# Patient Record
Sex: FEMALE | URBAN - METROPOLITAN AREA
[De-identification: names, ages, dates, MRNs, and addresses within clinical notes are randomized per-mention and may not be internally consistent; named-entity substitution may affect disease eponyms.]

---

## 2023-01-11 ENCOUNTER — OFFICE VISIT (OUTPATIENT)
Dept: BARIATRICS/WEIGHT MGMT | Age: 28
End: 2023-01-11
Payer: MEDICARE

## 2023-01-11 VITALS
WEIGHT: 293 LBS | SYSTOLIC BLOOD PRESSURE: 118 MMHG | DIASTOLIC BLOOD PRESSURE: 78 MMHG | BODY MASS INDEX: 44.41 KG/M2 | HEIGHT: 68 IN | HEART RATE: 90 BPM | RESPIRATION RATE: 20 BRPM

## 2023-01-11 DIAGNOSIS — R06.09 DYSPNEA ON EXERTION: ICD-10-CM

## 2023-01-11 DIAGNOSIS — E66.01 MORBID OBESITY WITH BMI OF 50.0-59.9, ADULT (HCC): ICD-10-CM

## 2023-01-11 DIAGNOSIS — K21.9 GERD WITHOUT ESOPHAGITIS: Primary | ICD-10-CM

## 2023-01-11 PROCEDURE — G8484 FLU IMMUNIZE NO ADMIN: HCPCS | Performed by: SURGERY

## 2023-01-11 PROCEDURE — 4004F PT TOBACCO SCREEN RCVD TLK: CPT | Performed by: SURGERY

## 2023-01-11 PROCEDURE — G8427 DOCREV CUR MEDS BY ELIG CLIN: HCPCS | Performed by: SURGERY

## 2023-01-11 PROCEDURE — G8417 CALC BMI ABV UP PARAM F/U: HCPCS | Performed by: SURGERY

## 2023-01-11 PROCEDURE — 99204 OFFICE O/P NEW MOD 45 MIN: CPT | Performed by: SURGERY

## 2023-01-11 NOTE — PROGRESS NOTES
[facility],[address] INVASIVE BARIATRIC SURG  [address] \[facility] [place] [facility]\"" [address]  Dept: [phone]    SURGICAL WEIGHT MANAGEMENT PROGRAM  PROGRESS NOTE INITIAL EVALUATION     Patient: Alli Chiu        Service Date: 1/11/2023      HPI:     Chief Complaint   Patient presents with    Bariatric, Initial Visit    Weight Loss       The patient is a pleasant 28y.o. year old female  with morbid obesity, who stands Height: 5' 8\" (172.7 cm) tall with a weight of Weight: (!) 330 lb (149.7 kg) , resulting in a BMI of Body mass index is 50.18 kg/m². . The patient suffers from multiple co-morbidities as a result of morbid obesity, including: GERD. She has suffered from obesity for many years. The patient denies  a history of myocardial infarction, deep vein thrombosis, pulmonary embolism, renal failure, hepatic failure, and stroke. The patient has failed multiple attempts at non-surgical weight loss, and is now seeking surgical intervention to promote permanent and consistent weight loss. She  has chosen Sleeve Gastrectomy. She is well educated regarding it, as she has recently viewed our weight loss surgery informational seminar . Medical History:  No past medical history on file. Surgical History:  No past surgical history on file. Family History:  No family history on file. Social History:   Social History     Tobacco Use    Smoking status: Every Day     Types: Cigarettes    Smokeless tobacco: Never   Substance Use Topics    Drug use: Yes     Types: Marijuana Dauna Other)       Current Med List:  No current outpatient medications on file. No current facility-administered medications for this visit. SOCIAL:      This patient is alone for the evaluation today.       [] HIV Risk Factors (i.e.) intravenous drug abuser; at risk sexual behavior; received blood products    [] TB Risk Factors (i.e.) [facility] care, foreign born, endemic area; exposure to active case    [] Hepatitis B&C Risk Factors (i.e.) Received blood transfusion prior to 1992; recreational drug use; high risk sexual behaviors; tattoos or body piercings; contact with blood or needle sticks in the workplace    Comprehension    Ability to grasp concepts and respond to questions:   [x] High   [] Medium   [] Low    Motivation    [x] Asks Questions; eager to learn   [] Needs education   [] Extreme anxiety    [] uncooperative   [] Denies need for education    English Speaking Ability    [x] Speaks English well   [x] Reads English well   [x] Understands spoken english    [x] Understands written English   [] No need for interpretive support      [] Might benefit from interpretive support   []  required for all services     REVIEW OF SYSTEMS: (Negative unless marked otherwise)     See review of Systems scanned into media    PRESENT ILLNESS:     Weight Parameters  Weight (!) 330 lb (149.7 kg)   Height 5' 8\" (1.727 m)   BMI Body mass index is 50.18 kg/m². IBW     EBW               IMMUNIZATION STATUS    There is no immunization history on file for this patient. FALLS ASSESSMENT    [x] LOW RISK FOR FALLS    [] MODERATE RISK FOR FALLS    [] Difficulty walking/selfcare    [] Falls in the past 2 months    [] Suspicion of Clinician    [] Other:      SMOKING CESSATION     [] Not needed     [x] Instructed to stop smoking    [] Pamphlet community resources given     VTE SCREEN    [] Family hx DVT/PE  /   [] Personal hx of DVT/PE    [x] Denies any family or personal hx of DVT/PE    Physician Review    [x] Past medical, family, & social history reviewed and discussed with patient.      Review of surgery and post-surgical changes (by surgeon for surgical patients only)    [x] Lifelong diet expectations reviewed with patient    [x] Need for lifelong vitamin supplementation reviewed with patient    PHYSICAL EXAMINATION:      /78 (Site: Left Upper Arm, Position: Sitting, Cuff Size: Large Adult)   Pulse 90   Resp 20   Ht 5' 8\" (1.727 m)   Wt (!) 330 lb (149.7 kg)   LMP 12/19/2022 (Approximate)   BMI 50.18 kg/m²     Constitutional:  Vital signs are normal. The patient appears well-developed   HEENT:      Head: Normocephalic. Atraumatic     Eyes: pupils are equal and reactive.  No scleral icterus is present.     Neck: No mass and no thyromegaly present.   Cardiovascular: Normal rate, regular rhythm, S1 normal and S2 normal.  Bilateral pulses present.  Pulmonary/Chest: Effort normal and breath sounds normal. No retractions.  Abdominal: Soft. Normal appearance. There is no organomegaly. No tenderness. There is no rigidity, no rebound, no guarding and no Wiseman's sign.   Musculoskeletal:      Right lower leg: Normal. No tenderness and no edema.      Left lower leg: Normal. No tenderness and no edema.   Lymphadenopathy:     No cervical adenopathy, No Exrtemity Adenopathy.   Neurological: The patient is alert and oriented. Moving all four extremities equally, sensation grossly intact bilateral.  Skin: Skin is warm, dry and intact.   Psychiatric: The patient has a normal mood and affect. Speech is normal and behavior is normal. Judgment and thought content normal. Cognition and memory are normal.     RECOMMENDATIONS:     We spent a great deal of time discussing the risks and benefits of Sleeve Gastrectomy, including but not limited to injury to intra-abdominal organs, breakdown of the gastric staple line, the need for re-operative therapy,  prolonged hospitalization,  mechanical ventilation,  and death. We discussed the possibility of bleeding, the need for blood transfusions, blood clots, hospital-acquired and intra-abdominal infection, anastomotic stricture, and worsening GERD.  And we discussed the need for post-operative visit compliance, behavior modifications and diet changes, protein and vitamin supplementation, as well as routine scheduled and dedicated  exercise. I instructed the patient to utilize the exercise log that will be given to them at their fist dietician appointment. We discussed the potential weight loss benefit of approximately 50-60% of her excess body weight at 12-18 months post-op, as well as the possibility of insufficient weight loss or weight gain after 2 years post-operative time. Discussed the risk of substance abuse and or nicotine abuse today with patient. They expressed understanding of the risks of abuse of such drugs. PLAN:       Diagnosis Orders   1. GERD without esophagitis        2. Dyspnea on exertion        3. Morbid obesity with BMI of 50.0-59.9, adult ([facility])               Initial Testing     Primary Procedure: Sleeve Gastrectomy     Labwork: Initial Pre-surgical Lab Tests (CMP, TSH, Fasting Lipid Profile, Mg, Zinc, Vit B1 (whole blood), Vit B12, 25-OH Vit D, Fe,  Ferritin,  Folate) to rule out nutritional deficiencies    Endoscopic Studies: Upper GI Endoscopy for GERD which has been untreated. Psychological Assessment: Psychological Evaluation and Clearance for morbid obesity    Nutrition Assessment: Bariatric Nutrition Assessment and Clearance    Other  Consultations:  Medical clearance for BMI 50    Physician Supervised Diet and Exercise required by the patients insurance company: 3 months. Surgical Diet requirement:  2 weeks    Medical and surgical options for weight loss discussed with patient. All questions answered. Decision to move forward with surgical weight loss pathway    Final Testing  Screening Chest Xray  and EKG within 6 months of date of surgery    Labwork:  Final Lab Tests  within 3 months of date of surgery (CBC, PT/PTT, BMP)     Scribe Attestation:  Sohail HARVEY, scribed on behalf of Adam Jacobsen DO. Electronically signed by Adam Jacobsen DO on 1/19/2023 at 12:52 PM    Adam HARVEY DO DO, personally performed the services described in this documentation.  All medical record entries made by the scribe were at my direction and in my presence. I have reviewed the chart and discharge instructions (if applicable) and agree that the record reflects my personal performance and is accurate and complete.     Electronically Signed: Dorys Aldana DO. 01/19/23. 12:52 PM.

## 2023-01-25 ENCOUNTER — TELEPHONE (OUTPATIENT)
Dept: BARIATRICS/WEIGHT MGMT | Age: 28
End: 2023-01-25

## 2023-01-25 NOTE — TELEPHONE ENCOUNTER
Pt calls at 5pm 1/25/2023 to cancel her appt with Dr Edyta Win on Thursday 1/26/2023 at 10:30 am. Pt instructed to call office during business hours. Please contact Pt at [phone] to reschedule.

## 2023-02-01 ENCOUNTER — TELEPHONE (OUTPATIENT)
Dept: BARIATRICS/WEIGHT MGMT | Age: 28
End: 2023-02-01

## 2023-02-01 NOTE — LETTER
[facility] ([facility]) [facility] and Surgical Specialist  [address] [address]  Phone: [phone]  Fax: [phone]        February 1, 2023     [address] [facility] Serafin Rivera [address] [place] [address]      Dear Brandi Griffin: At  [facility] we strive to provide our patients with excellent medical care enhancing their  health and quality of  life. Our records indicate that you missed  your appointment with Terrell Lemus, MS, RD, LD  on 2/1/2023. Your  insurance  may  require consecutive  monthly visit  as  a qualification for bariatric surgery. Please call the office  at  [phone] to reschedule your appointment  to maintain your  consecutive  monthly visit requirement. We do recognize that everyones time is valuable and that appointment time is limited, therefore we do ask that you provide 24 hour notice if you are unable to keep your appointment. This is the first scheduled appointment that you have missed within the last twelve months. If you miss 2 more appointment, you may be dismissed from the practice.        Sincerely,        [facility] ([facility]) [facility] Management and Surgical Specialist

## 2024-06-25 ENCOUNTER — HOSPITAL ENCOUNTER (EMERGENCY)
Age: 29
Discharge: HOME | End: 2024-06-26
Attending: EMERGENCY MEDICINE
Payer: MEDICAID

## 2024-06-25 VITALS
TEMPERATURE: 98.1 F | WEIGHT: 293 LBS | HEIGHT: 68 IN | DIASTOLIC BLOOD PRESSURE: 104 MMHG | RESPIRATION RATE: 18 BRPM | HEART RATE: 115 BPM | OXYGEN SATURATION: 99 % | BODY MASS INDEX: 44.41 KG/M2 | SYSTOLIC BLOOD PRESSURE: 168 MMHG

## 2024-06-25 DIAGNOSIS — J02.0 STREPTOCOCCAL SORE THROAT: Primary | ICD-10-CM

## 2024-06-25 LAB
SPECIMEN SOURCE: ABNORMAL
STREP A, MOLECULAR: POSITIVE

## 2024-06-25 PROCEDURE — 87651 STREP A DNA AMP PROBE: CPT

## 2024-06-25 PROCEDURE — 6360000002 HC RX W HCPCS: Performed by: EMERGENCY MEDICINE

## 2024-06-25 PROCEDURE — 99283 EMERGENCY DEPT VISIT LOW MDM: CPT

## 2024-06-25 RX ORDER — AMOXICILLIN 500 MG/1
500 CAPSULE ORAL ONCE
Status: COMPLETED | OUTPATIENT
Start: 2024-06-26 | End: 2024-06-26

## 2024-06-25 RX ORDER — DEXAMETHASONE SODIUM PHOSPHATE 10 MG/ML
10 INJECTION, SOLUTION INTRAMUSCULAR; INTRAVENOUS ONCE
Status: COMPLETED | OUTPATIENT
Start: 2024-06-25 | End: 2024-06-25

## 2024-06-25 RX ADMIN — DEXAMETHASONE SODIUM PHOSPHATE 10 MG: 10 INJECTION, SOLUTION INTRAMUSCULAR; INTRAVENOUS at 20:15

## 2024-06-25 ASSESSMENT — ENCOUNTER SYMPTOMS
COUGH: 0
NAUSEA: 0
ABDOMINAL PAIN: 0
VOMITING: 0
SHORTNESS OF BREATH: 0
SORE THROAT: 1

## 2024-06-25 ASSESSMENT — PAIN DESCRIPTION - PAIN TYPE: TYPE: ACUTE PAIN

## 2024-06-25 ASSESSMENT — LIFESTYLE VARIABLES
HOW MANY STANDARD DRINKS CONTAINING ALCOHOL DO YOU HAVE ON A TYPICAL DAY: PATIENT DOES NOT DRINK
HOW OFTEN DO YOU HAVE A DRINK CONTAINING ALCOHOL: NEVER

## 2024-06-25 ASSESSMENT — PAIN - FUNCTIONAL ASSESSMENT: PAIN_FUNCTIONAL_ASSESSMENT: 0-10

## 2024-06-25 ASSESSMENT — PAIN DESCRIPTION - DESCRIPTORS: DESCRIPTORS: ACHING;DISCOMFORT

## 2024-06-25 ASSESSMENT — PAIN SCALES - GENERAL: PAINLEVEL_OUTOF10: 10

## 2024-06-25 ASSESSMENT — PAIN DESCRIPTION - FREQUENCY: FREQUENCY: CONTINUOUS

## 2024-06-26 PROCEDURE — 6370000000 HC RX 637 (ALT 250 FOR IP): Performed by: EMERGENCY MEDICINE

## 2024-06-26 RX ORDER — AMOXICILLIN 500 MG/1
500 CAPSULE ORAL 2 TIMES DAILY
Qty: 14 CAPSULE | Refills: 0 | Status: SHIPPED | OUTPATIENT
Start: 2024-06-26 | End: 2024-07-03

## 2024-06-26 RX ADMIN — AMOXICILLIN 500 MG: 500 CAPSULE ORAL at 00:05

## 2024-06-26 NOTE — ED PROVIDER NOTES
[facility] ED  Emergency Department Encounter  Emergency Medicine Resident     Pt Name:Nneka Calle  MRN: [id number]  Birthdate 1995  Date of evaluation: 6/25/24  PCP:  Sarah De La Cruz MD  Note Started: 8:02 PM EDT      CHIEF COMPLAINT       Chief Complaint   Patient presents with    Pharyngitis       HISTORY OF PRESENT ILLNESS  (Location/Symptom, Timing/Onset, Context/Setting, Quality, Duration, Modifying Factors, Severity.)      Nneka Calle is a 28 y.o. female who presents with pharyngitis, difficulty swallowing, started yesterday morning.  Began having sweats and chills last night.  Patient states she feels like her tonsils are swollen.  Has pain with swallowing, however no difficulty tolerating her secretions.  No difficulty breathing.    PAST MEDICAL / SURGICAL / SOCIAL / FAMILY HISTORY      has no past medical history on file.       has no past surgical history on file.      Social History     Socioeconomic History    Marital status: Single     Spouse name: Not on file    Number of children: Not on file    Years of education: Not on file    Highest education level: Not on file   Occupational History    Not on file   Tobacco Use    Smoking status: Every Day     Types: Cigarettes    Smokeless tobacco: Never   Substance and Sexual Activity    Alcohol use: Not on file    Drug use: Yes     Types: Marijuana (Weed)    Sexual activity: Not on file   Other Topics Concern    Not on file   Social History Narrative    Not on file     Social Determinants of Health     Financial Resource Strain: Not on file   Food Insecurity: Not on file   Transportation Needs: Not on file   Physical Activity: Not on file   Stress: Not on file   Social Connections: Not on file   Intimate Partner Violence: Not on file   Housing Stability: Not on file       No family history on file.    Allergies:  Patient has no known allergies.    Home Medications:  Prior to Admission medications    Medication Sig Start  edema.   Skin:     General: Skin is warm and dry.   Neurological:      Mental Status: She is alert and oriented to person, place, and time.   Psychiatric:         Mood and Affect: Mood normal.         Behavior: Behavior normal.         Judgment: Judgment normal.           DDX/DIAGNOSTIC RESULTS / EMERGENCY DEPARTMENT COURSE / MDM     Medical Decision Making  28-year-old female without significant past medical history who presents with pharyngitis for approximately 2 days.  On arrival, she is hypertensive, tachycardic, otherwise vital signs are stable.  She has significant edema to the tonsils bilaterally with tonsillar exudates.  Patient has intact airway, she is tolerating her secretions without difficulty.  There is no stridor.  No trismus.  There is submandibular swelling with tenderness to palpation as well as anterior cervical lymphadenopathy.  Will obtain CT of his cervical soft tissue to rule out any cervical soft tissue infection, strep swab and give the patient a dose of oral Decadron.  Disposition pending clinical findings.    Amount and/or Complexity of Data Reviewed  Labs: ordered. Decision-making details documented in ED Course.    Risk  Prescription drug management.      EMERGENCY DEPARTMENT COURSE:      ED Course as of 06/26/24 0349 Tue Jun 25, 2024 2127 Strep A, Molecular(!): POSITIVE [BL]      ED Course User Index  [BL] Leah Mar DO     Patient decided to leave AGAINST MEDICAL ADVICE prior to CT imaging being done.  I did discuss risks of leaving prior to CT imaging including progressive infection, airway compromise, ultimately death. The patient is clinically sober, free from distracting injury, appears to have intact insight and judgment and reason and in my opinion has the capacity to make decisions.  She would like to leave and states she will follow-up outpatient or return to the ER in [place].    PROCEDURES:      CONSULTS:  None    CRITICAL CARE:  There was significant risk of life

## 2024-06-26 NOTE — ED NOTES
Pt arrived through triage for c/o a sore throat x2 days  PT state her tonsils feel swollen  Pt states she has been episodes of being hot and cold  Pt states she has not taken any medications  PT states she's been feeling weak  RR even and unlabored  Pms intact  Call light within reach

## 2024-06-26 NOTE — DISCHARGE INSTRUCTIONS
You are seen in the ER today for your sore throat.  We did want to do a CT scan to rule out any soft tissue infection which you did not want to wait for at this time.  We had you sign AGAINST MEDICAL ADVICE for this reason.  You verbalized understanding of the risks of leaving AGAINST MEDICAL ADVICE and continued to wish to leave.  We will discharge you on a prescription for an antibiotic called amoxicillin.  Please take this as prescribed.  Please return to the ER for any new or worsening symptoms.  Otherwise, please follow-up with your primary care provider to be reassessed.    PLEASE RETURN TO THE EMERGENCY DEPARTMENT IMMEDIATELY if you develop any concerning symptoms such as: chest pain, shortness of breath, feeling like your heart is racing, high fever not relieved by acetaminophen (Tylenol) and/or ibuprofen (Motrin / Advil), chills, persistent nausea and/or vomiting, loss of consciousness, numbness, weakness or tingling in the arms or legs or change in color of the extremities, changes in mental status, persistent or severe headache, blurry vision, loss of bladder / bowel control, unable to follow up with your physician, or other any other care or concern.

## 2024-06-26 NOTE — ED PROVIDER NOTES
[facility] ED     Emergency Department     Faculty Attestation    I performed a history and physical examination of the patient and discussed management with the resident. I reviewed the resident’s note and agree with the documented findings and plan of care. Any areas of disagreement are noted on the chart. I was personally present for the key portions of any procedures. I have documented in the chart those procedures where I was not present during the key portions. I have reviewed the emergency nurses triage note. I agree with the chief complaint, past medical history, past surgical history, allergies, medications, social and family history as documented unless otherwise noted below. For Physician Assistant/ Nurse Practitioner cases/documentation I have personally evaluated this patient and have completed at least one if not all key elements of the E/M (history, physical exam, and MDM). Additional findings are as noted.    8:25 PM EDT    Patient presents with a sore throat that she has had since yesterday.  She says she has had a little bit of headache with it.  She denies cough or congestion.  On exam, patient is sitting in the bed and appears mildly uncomfortable.  She is not in respiratory distress.  There is no stridor and patient is handling secretions without difficulty.  Lungs are clear to auscultation bilaterally.  Heart sounds are tachycardic but regular.  The bilateral tonsils are moderately enlarged with exudate.  Will get rapid strep test as well as CT scan soft tissue neck.  Will treat patient's pain and reassess.      Gracie Rocha MD  Attending Emergency  Physician

## 2024-06-26 NOTE — ED PROVIDER NOTES
[facility]   Emergency Department  Emergency Medicine Attending Sign-out   Note started: 6:15 PM EDT    Care of Nneka Calle was assumed from previous attending Dr. Rocha at 12 AM and is being seen for Pharyngitis  .  The patient's initial evaluation and plan have been discussed with the prior provider who initially evaluated the patient.     Attestation  I was available and discussed any additional care issues that arose and coordinated the management plans with the resident(s) caring for the patient during my duty period. Any areas of disagreement with resident's documentation of care or procedures are noted on the chart. I was personally present for the key portions of any/all procedures, during my duty period. I have documented in the chart those procedures where I was not present during the key portions.     BRIEF PATIENT SUMMARY/MDM COURSE PER INITIAL PROVIDER:   RECENT VITALS:     Temp: 98.1 °F (36.7 °C),  Pulse: (!) 115, Respirations: 18, BP: (!) 168/104, SpO2: 99 %    This patient is a 28 y.o. Female with sore throat and significant tonsilar hypertrophy.  CT soft tissue neck ordered.     DIAGNOSTICS/MEDICATIONS:     MEDICATIONS GIVEN:  ED Medication Orders (From admission, onward)      Start Ordered     Status Ordering Provider    06/26/24 0000 06/25/24 8549  amoxicillin (AMOXIL) capsule 500 mg  ONCE        Question Answer Comment   Antimicrobial Indications Other    Other Abx Indication Strep        Last MAR action: Given - by CASE, MARII on 06/26/24 at 0005 LINDA ESTRELLA    06/25/24 2015 06/25/24 2012  dexAMETHasone (DECADRON) Oral 10 mg  ONCE         Last MAR action: Given - by CASEMARII on 06/25/24 at 2015 LINDA ESTRELLA            LABS    Labs Reviewed   RAPID STREP SCREEN - Abnormal; Notable for the following components:       Result Value    Strep A, Molecular POSITIVE (*)     All other components within normal limits       RADIOLOGY  No results  found.    OUTSTANDING TASKS / ADDITIONAL COMMENTS   Patient requesting to leave  Resident disucssed with patient and patient signed out AMA at this time because CT not completed.   Started on anitbioitcs before discharge.     Kira Edmondson MD  Emergency Medicine Attending  [facility]       Kira Edmondson MD  06/26/24 8328

## 2024-11-02 ENCOUNTER — HOSPITAL ENCOUNTER (EMERGENCY)
Age: 29
Discharge: HOME | End: 2024-11-02
Attending: EMERGENCY MEDICINE
Payer: MEDICAID

## 2024-11-02 VITALS
RESPIRATION RATE: 18 BRPM | TEMPERATURE: 98.3 F | BODY MASS INDEX: 44.41 KG/M2 | HEIGHT: 68 IN | WEIGHT: 293 LBS | HEART RATE: 72 BPM | DIASTOLIC BLOOD PRESSURE: 119 MMHG | OXYGEN SATURATION: 98 % | SYSTOLIC BLOOD PRESSURE: 135 MMHG

## 2024-11-02 DIAGNOSIS — F41.1 ANXIETY STATE: Primary | ICD-10-CM

## 2024-11-02 PROCEDURE — 93005 ELECTROCARDIOGRAM TRACING: CPT | Performed by: EMERGENCY MEDICINE

## 2024-11-02 PROCEDURE — 99282 EMERGENCY DEPT VISIT SF MDM: CPT

## 2024-11-02 ASSESSMENT — ENCOUNTER SYMPTOMS: NAUSEA: 1

## 2024-11-02 NOTE — DISCHARGE INSTRUCTIONS
You were seen today in the emergency department for your shortness of breath, tingling and palpitations.  We have evaluated you and determined that you likely had an episode of anxiety, please follow-up with your psychiatrist.  Your examination and evaluation looks reassuring, we think you are safe to go home.  If you develop any new symptoms please come back to the emergency department.    Please return to the emergency department immediately if develop any new or worsening concerns including chest pain, shortness of breath, abdominal pain, nausea, vomiting, diarrhea, weakness, loss consciousness, fever, chills, or any other concerns.    Please call your PCP and schedule appointment within the next 24 to 48 hours for follow-up.

## 2024-11-02 NOTE — ED PROVIDER NOTES
[facility] ED  eMERGENCY dEPARTMENT eNCOUnter   Attending Attestation     Pt Name: Nneka Calle  MRN: [id number]  Birthdate 1995  Date of evaluation: 11/2/24       Nneka Calle is a 29 y.o. female who presents with Shortness of Breath (Feels as if she cannot catch her breath, has hx of anxiety)      4:25 PM EDT      History: Patient presents with anxiety.  Patient says the anxiety she is feeling is typical for her she says she has not multiple homes a week but has been more frequent lately.  Patient says she takes medication for this but that it is not helping.  Patient says she plans to talk to her doctor about this.  Patient says that she got tingling in her legs and arms as well as her lips.  She says that that is gone now.  She says this was all triggered by driving which typically causes her issues.    Exam: Heart rate and rhythm are regular.  Lungs are clear to auscultation bilaterally.  Abdomen is soft, nontender.  Patient is awake and alert and acting appropriately.  Patient is well-appearing at this time.    EKG shows normal sinus rhythm with sinus arrhythmia.  Normal axis.  No ST elevation or depression.  T waves are upright.  No blocks .  Nonspecific EKG.    Patient was reassured.  Patient says that usually she comes to the hospital when she is having the symptoms and get some reassurance and is able to go home.  Patient says she is ready to go home and does not want any further workup if we do not think she needs any.  Patient offered to stay in the emergency department for short while longer to remain in this safe environment however she says that she is okay with going home at this time and if she needs to she will sit in her car a little longer before she drives.          I performed a history and physical examination of the patient and discussed management with the resident. I reviewed the resident’s note and agree with the documented findings and plan of care. Any areas of

## 2024-11-02 NOTE — ED NOTES
Pt arrives alert and oriented x4 and ambulatory from triage  Pt reports feeling SOB, and a \"funny feeling in her chest\"   Pt states hx of anxiety and believes it to be related   Pt states she is on medicine for anxiety, however she didn't take it this morning   Pt placed on cardiac monitor, continuous pulse ox, and BP cuff.  RR even and unlabored.   NAD noted.   Whiteboard updated.  Will continue with plan of care.

## 2024-11-02 NOTE — ED PROVIDER NOTES
[facility] ED  Emergency Department Encounter  Emergency Medicine Resident     Pt Name:Nneka Calle  MRN: [id number]  Birthdate 1995  Date of evaluation: 11/2/24  PCP:  Sarah De La Cruz MD  Note Started: 3:57 PM EDT      CHIEF COMPLAINT       Chief Complaint   Patient presents with    Shortness of Breath     Feels as if she cannot catch her breath, has hx of anxiety       HISTORY OF PRESENT ILLNESS  (Location/Symptom, Timing/Onset, Context/Setting, Quality, Duration, Modifying Factors, Severity.)      Nneka Calle is a 29 y.o. female who presents with the complaint of shortness of breath, tingling in her mouth and palpitations consistent with her episodes of anxiety.  Patient states that driving out of town and drinking alcohol triggers her anxiety episodes.  Patient states she experiences 2-3 episodes every week, she takes Zoloft 25 mg for these episodes, which helps her minimally.    Patient is states usually she is able to calm herself down and such episodes, but today she was not able to and that is why she visited the emergency department.  Right now patient is not complaining of any shortness of breath or tingling or palpitations.  Patient states she is feeling better and calm knowing that there are people around her to help her.        PAST MEDICAL / SURGICAL / SOCIAL / FAMILY HISTORY      has no past medical history on file.       has no past surgical history on file.      Social History     Socioeconomic History    Marital status: Single     Spouse name: Not on file    Number of children: Not on file    Years of education: Not on file    Highest education level: Not on file   Occupational History    Not on file   Tobacco Use    Smoking status: Every Day     Types: Cigarettes    Smokeless tobacco: Never   Substance and Sexual Activity    Alcohol use: Not on file    Drug use: Yes     Types: Marijuana (Weed)    Sexual activity: Not on file   Other Topics Concern    Not on file  is 5.      Cranial Nerves: Cranial nerves 2-12 are intact.      Sensory: Sensation is intact.      Motor: Motor function is intact.      Coordination: Coordination is intact.      Gait: Gait is intact.      Comments: Cranial nerves standard 12 intact.  Normal facial symmetry and motor function noted.  Sensory responses normal.   Psychiatric:         Behavior: Behavior normal. Behavior is cooperative.      Comments: Anxiety not evident during examination.           DDX/DIAGNOSTIC RESULTS / EMERGENCY DEPARTMENT COURSE / MDM     Medical Decision Making  Problem List / Differential Diagnosis: Differential diagnosis includes but is not limited to...    Episode of anxiety    Plan: EKG, reassurance and observation    Reassessment / Response to Plan / Findings: In brief, 29-year-old female presented emergency department with a complaint of shortness of breath, palpitation and tingling sensation in her mouth.     Will provide reassurance and will observe the patient in the emergency department to ensure stability.  Will consider an EKG to x-ray to exclude any underlying cardiac causes.  Will encourage a follow-up with primary psychiatrist to evaluate the current management plan and medications doses or changes.  Will advise avoiding known triggers such as alcohol to prevent further exacerbation of symptoms.  Will ensure patient understanding of anxiety management strategies and lifestyle adjustment to reduce stressors where possible.    On exam, patient is calm, comfortable, in no acute distress, patient's EKG shows normal sinus rhythm.  Will plan to discharge the patient with advised to follow-up with her psychiatrist, will also guide the patient about return precautions.  Patient agreeable with the treatment plan    *Additional specifics as per ED course.    DISPO: Discharge the patient             EKG      All EKG's are interpreted by the Emergency Department Physician who either signs or Co-signs this chart in the

## 2024-11-04 LAB
EKG ATRIAL RATE: 94 BPM
EKG P AXIS: 29 DEGREES
EKG P-R INTERVAL: 124 MS
EKG Q-T INTERVAL: 376 MS
EKG QRS DURATION: 84 MS
EKG QTC CALCULATION (BAZETT): 470 MS
EKG R AXIS: 63 DEGREES
EKG T AXIS: 44 DEGREES
EKG VENTRICULAR RATE: 94 BPM